# Patient Record
Sex: MALE | Race: WHITE | NOT HISPANIC OR LATINO | Employment: OTHER | URBAN - NONMETROPOLITAN AREA
[De-identification: names, ages, dates, MRNs, and addresses within clinical notes are randomized per-mention and may not be internally consistent; named-entity substitution may affect disease eponyms.]

---

## 2019-02-04 ENCOUNTER — OFFICE VISIT (OUTPATIENT)
Dept: NEUROSURGERY | Facility: CLINIC | Age: 69
End: 2019-02-04

## 2019-02-04 DIAGNOSIS — M51.36 BULGING LUMBAR DISC: Primary | ICD-10-CM

## 2019-02-04 DIAGNOSIS — M51.36 DDD (DEGENERATIVE DISC DISEASE), LUMBAR: ICD-10-CM

## 2019-02-04 PROCEDURE — 99213 OFFICE O/P EST LOW 20 MIN: CPT | Performed by: NEUROLOGICAL SURGERY

## 2019-02-04 NOTE — PROGRESS NOTES
Subjective   Patient ID: Reji Villela is a 68 y.o. male is being seen for consultation today at the request of MARJORIE Julian  Chief Complaint: Back and right leg pain    History of Present Illness: Patient is a 68-year-old man from [place] with a history of pain in his lower back radiating to the right hip and leg in his thigh as far as his knee with numbness of the lateral thigh.  Is been present for about 6 months, but has improved somewhat in the past 2 or 3 months.  Pain is worse with standing and walking.  He has not started physical therapy, however it has been ordered pending his consultation with neurosurgery.  Apparently had a consultation with neurology and it was suspected that there was a nerve root compression in the lumbar spine.    Review of Radiographic Studies:  Lumbar MRI scan done on 12/10/2018 shows degenerative disc and facet changes from L2 through S1, and at L2-3 on the left there is a herniated disc with a slightly caudally migrated component.  This is contralateral to the side of his clinical pain.    The following portions of the patient's history were reviewed, updated as appropriate and approved: allergies, current medications, past family history, past medical history, past social history, past surgical history, review of systems and problem list.    Review of Systems   Constitutional: Negative for activity change, appetite change, chills, diaphoresis, fatigue, fever and unexpected weight change.   HENT: Negative for congestion, dental problem, drooling, ear discharge, ear pain, facial swelling, hearing loss, mouth sores, nosebleeds, postnasal drip, rhinorrhea, sinus pressure, sneezing, sore throat, tinnitus, trouble swallowing and voice change.    Eyes: Negative for photophobia, pain, discharge, redness, itching and visual disturbance.   Respiratory: Negative for apnea, cough, choking, chest tightness, shortness of breath, wheezing and stridor.    Cardiovascular: Negative for  chest pain, palpitations and leg swelling.   Gastrointestinal: Negative for abdominal distention, abdominal pain, anal bleeding, blood in stool, constipation, diarrhea, nausea, rectal pain and vomiting.   Endocrine: Negative for cold intolerance, heat intolerance, polydipsia, polyphagia and polyuria.   Genitourinary: Negative for decreased urine volume, difficulty urinating, dysuria, enuresis, flank pain, frequency, genital sores, hematuria and urgency.   Musculoskeletal: Positive for arthralgias, back pain, gait problem, joint swelling, myalgias, neck pain and neck stiffness.   Skin: Negative for color change, pallor, rash and wound.   Allergic/Immunologic: Negative for environmental allergies, food allergies and immunocompromised state.   Neurological: Positive for dizziness, tremors, weakness and light-headedness. Negative for seizures, syncope, facial asymmetry, speech difficulty, numbness and headaches.   Hematological: Negative for adenopathy. Does not bruise/bleed easily.   Psychiatric/Behavioral: Negative for agitation, behavioral problems, confusion, decreased concentration, dysphoric mood, hallucinations, self-injury, sleep disturbance and suicidal ideas. The patient is not nervous/anxious and is not hyperactive.        Objective     NEUROLOGICAL EXAMINATION:      MENTAL STATUS:  Alert and oriented.  Speech intact.  Recent and remote memory intact.      CRANIAL NERVES:  Cranial nerve II:  Visual fields are full.  Cranial nerves III, IV and VI:  PERRLADC.  Extraocular movements are intact.  Nystagmus is not present.  Cranial nerve V:  Facial sensation is intact.  Cranial nerve VII:  Muscles of facial expression reveal no asymmetry.  Cranial nerve VIII:  Hearing is intact.  Cranial nerves IX and X:  Palate elevates symmetrically.  Cranial nerve XI:  Shoulder shrug is intact.  Cranial nerve XII:  Tongue is midline without evidence of atrophy or fasciculation.    MUSCULOSKELETAL:  SLR negative  bilaterally.    MOTOR: No weakness of knee extension, ankle dorsiflexion, or plantarflexion, although in the past he has tended to fall from a sense of weakness in the right leg.    SENSATION: Numbness of the right lateral thigh.    REFLEXES:  DTR absent both knees and both ankles.      Assessment   Right lumbar radicular pain, clinically from a mid lumbar level, however MRI scan is negative for disc herniation, stenosis, or instability on the right side.  There is a radiographic finding of L2-3 herniation on the left side, which is contralateral to his symptomatic side of pain.       Plan   Physical therapy should be done as the primary treatment.  There is not a surgical option that I can see, so I would rely on the result of his physical therapy.  Since he has already shown signs of improvement in symptoms this should be successful.       Al Hilton MD

## 2020-09-29 ENCOUNTER — TELEPHONE (OUTPATIENT)
Dept: NEUROSURGERY | Facility: CLINIC | Age: 70
End: 2020-09-29

## 2020-09-29 NOTE — TELEPHONE ENCOUNTER
PT IS A FORMER PT OF DR. SCOTT LOV 02/04/19. HE IS WANTING TO SCHEDULE AN APPT FOR NECK PAIN,  HANDS ARE GOING NUMB, AND LOWER BACK PAIN.     PATIENT # [phone]    PLEASE ADVISE

## 2020-10-15 ENCOUNTER — OFFICE VISIT (OUTPATIENT)
Dept: NEUROSURGERY | Facility: CLINIC | Age: 70
End: 2020-10-15

## 2020-10-15 VITALS
SYSTOLIC BLOOD PRESSURE: 134 MMHG | DIASTOLIC BLOOD PRESSURE: 64 MMHG | HEIGHT: 69 IN | BODY MASS INDEX: 28.79 KG/M2 | WEIGHT: 194.4 LBS | TEMPERATURE: 96.8 F

## 2020-10-15 DIAGNOSIS — M51.36 DDD (DEGENERATIVE DISC DISEASE), LUMBAR: ICD-10-CM

## 2020-10-15 DIAGNOSIS — M50.30 DDD (DEGENERATIVE DISC DISEASE), CERVICAL: Primary | ICD-10-CM

## 2020-10-15 PROCEDURE — 99213 OFFICE O/P EST LOW 20 MIN: CPT | Performed by: PHYSICIAN ASSISTANT

## 2020-10-15 RX ORDER — PREDNISONE 10 MG/1
10 TABLET ORAL DAILY
COMMUNITY
Start: 2020-08-06

## 2020-10-15 RX ORDER — TIZANIDINE 4 MG/1
4 TABLET ORAL NIGHTLY PRN
COMMUNITY
Start: 2020-09-18

## 2020-10-15 RX ORDER — ATORVASTATIN CALCIUM 40 MG/1
40 TABLET, FILM COATED ORAL DAILY
COMMUNITY
Start: 2020-09-08

## 2020-10-15 RX ORDER — EMPAGLIFLOZIN 25 MG/1
1 TABLET, FILM COATED ORAL EVERY MORNING
COMMUNITY
Start: 2020-09-16

## 2020-10-19 NOTE — PROGRESS NOTES
"Patient: Reji Villela  : 1950  Gender: male    Primary Care Provider: Provider, No Known    Chief Complaint: Back and neck pain.    History of Present Illness:  Reji Villela is a 69-year-old gentleman with a PMH significant for CAD, stroke, CKD, DM 2, hypertension.  He has a history of remote ACDF with Dr. Hilton.  He was last seen in our clinic on 2019 for back pain.  At that time studies demonstrated degenerative discs and facet changes L2-S1 with an asymptomatic herniated disc at L2-3 on the left.  No surgery was recommended.  Patient states that over the last 6 months or so his pain has worsened.  He cannot pinpoint what bothers him most.  He states that he has pain in his hands, hips, low back and bilateral legs.  He reports some sensory alteration in the left lower extremity.  His symptoms occur at all times and are not improved with any certain movement.  Patient's wife states that he is becoming \"more bent over\".  He denies gait disturbance, foot numbness, bowel or bladder dysfunction.  He has to lay down multiple times a day to get relief of his pain.  He does not really take much in the way of pain.  He has not been to any sort of therapy recently.  He has no new studies.      Past Medical and Surgical History:  Past Medical History:   Diagnosis Date   • Arthritis    • Asthma    • Chronic kidney disease    • Diabetes mellitus (CMS/HCC)    • Headache    • Heart disease    • Hypertension    • Low back pain    • Osteoporosis    • Stroke (CMS/[facility])      Past Surgical History:   Procedure Laterality Date   • CERVICAL FUSION  2012    ACDF -  @     • CORONARY STENT PLACEMENT     • KIDNEY STONE SURGERY         Current Medications:    Current Outpatient Medications:   •  atorvastatin (LIPITOR) 40 MG tablet, Take 40 mg by mouth Daily., Disp: , Rfl:   •  ciprofloxacin (CILOXAN) 0.3 % ophthalmic solution, , Disp: , Rfl:   •  clopidogrel (PLAVIX) 75 MG tablet, , Disp: , Rfl:   •  gabapentin " (NEURONTIN) 300 MG capsule, , Disp: , Rfl:   •  isosorbide mononitrate (IMDUR) 60 MG 24 hr tablet, , Disp: , Rfl:   •  Jardiance 25 MG tablet, Take 1 tablet by mouth Every Morning., Disp: , Rfl:   •  lisinopril (PRINIVIL,ZESTRIL) 40 MG tablet, , Disp: , Rfl:   •  metFORMIN (GLUCOPHAGE) 1000 MG tablet, , Disp: , Rfl:   •  metoprolol tartrate (LOPRESSOR) 50 MG tablet, , Disp: , Rfl:   •  NIFEdipine CC (ADALAT CC) 90 MG 24 hr tablet, , Disp: , Rfl:   •  prednisoLONE acetate (PRED FORTE) 1 % ophthalmic suspension, , Disp: , Rfl:   •  tiZANidine (ZANAFLEX) 4 MG tablet, Take 4 mg by mouth At Night As Needed., Disp: , Rfl:   •  cephalexin (KEFLEX) 500 MG capsule, , Disp: , Rfl:   •  hydrochlorothiazide (MICROZIDE) 12.5 MG capsule, , Disp: , Rfl:   •  predniSONE (DELTASONE) 10 MG tablet, Take 10 mg by mouth Daily. FOR 30 DAYS, Disp: , Rfl:     Allergies:  Allergies   Allergen Reactions   • Contrast Dye    • Iodine          Review of Systems   Musculoskeletal: Positive for arthralgias, back pain, gait problem, myalgias, neck pain and neck stiffness.   Neurological: Positive for numbness.   All other systems reviewed and are negative.        Physical Exam  Constitutional:       Appearance: Normal appearance.   HENT:      Head: Normocephalic and atraumatic.   Neck:      Musculoskeletal: Normal range of motion and neck supple.   Musculoskeletal: Normal range of motion.   Skin:     General: Skin is warm and dry.   Neurological:      Mental Status: He is alert and oriented to person, place, and time.      Sensory: Sensation is intact.      Motor: Motor function is intact.      Coordination: Coordination is intact.      Deep Tendon Reflexes:      Reflex Scores:       Bicep reflexes are 1+ on the right side and 1+ on the left side.       Brachioradialis reflexes are 1+ on the right side and 1+ on the left side.       Patellar reflexes are 1+ on the right side and 1+ on the left side.       Achilles reflexes are 1+ on the right side  "and 1+ on the left side.     Comments: Patient's neck and trunk flexed forward when walking  Gait is antalgic with assistance from a cane  Strength is equal and intact in upper and lower extremities   Psychiatric:         Mood and Affect: Mood normal.         Behavior: Behavior normal.           Vitals:    10/15/20 1502   BP: 134/64   BP Location: Left arm   Patient Position: Sitting   Cuff Size: Adult   Temp: 96.8 °F (36 °C)   TempSrc: Infrared   Weight: 88.2 kg (194 lb 6.4 oz)   Height: 175.3 cm (69\")       Patient's Body mass index is 28.71 kg/m². BMI is above normal parameters. Recommendations include: educational material.    Imaging Review:  No new imaging    Assessment:  1.  Chronic neck pain  2.  Chronic back pain    Plan:  Mr. Villela is seen today for evaluation of chronic neck and back pain.  His symptoms appear mechanical in nature with no focal radicular arm or leg complaints.  I had a long discussion with patient and his wife.  Deconditioning is likely playing a large role in his increased pain symptoms.  I recommended that he begin physical therapy to assist in getting some of his strength back.  Patient has multiple medical comorbidities.  I encouraged him to assure that his diabetes and coronary artery disease is followed closely.  I have ordered cervical x-rays to assess his hardware and lumbar x-rays for further evaluation.  We will schedule a telephone follow-up when these are completed.  Patient will call with any concerns or worsening symptoms.        Brunilda Joseph PA-C  "

## 2020-10-20 ENCOUNTER — HOSPITAL ENCOUNTER (OUTPATIENT)
Dept: GENERAL RADIOLOGY | Facility: HOSPITAL | Age: 70
Discharge: HOME | End: 2020-10-20

## 2020-10-20 DIAGNOSIS — M50.30 DDD (DEGENERATIVE DISC DISEASE), CERVICAL: ICD-10-CM

## 2020-10-20 DIAGNOSIS — M51.36 DDD (DEGENERATIVE DISC DISEASE), LUMBAR: ICD-10-CM

## 2020-10-20 PROCEDURE — 72050 X-RAY EXAM NECK SPINE 4/5VWS: CPT | Performed by: RADIOLOGY

## 2020-10-20 PROCEDURE — 72110 X-RAY EXAM L-2 SPINE 4/>VWS: CPT

## 2020-10-20 PROCEDURE — 72050 X-RAY EXAM NECK SPINE 4/5VWS: CPT

## 2020-10-20 PROCEDURE — 72110 X-RAY EXAM L-2 SPINE 4/>VWS: CPT | Performed by: RADIOLOGY

## 2020-11-23 ENCOUNTER — OFFICE VISIT (OUTPATIENT)
Dept: NEUROSURGERY | Facility: CLINIC | Age: 70
End: 2020-11-23

## 2020-11-23 VITALS — BODY MASS INDEX: 28.73 KG/M2 | WEIGHT: 194 LBS | HEIGHT: 69 IN

## 2020-11-23 DIAGNOSIS — M50.30 DDD (DEGENERATIVE DISC DISEASE), CERVICAL: Primary | ICD-10-CM

## 2020-11-23 DIAGNOSIS — M51.36 DDD (DEGENERATIVE DISC DISEASE), LUMBAR: ICD-10-CM

## 2020-11-23 PROCEDURE — 99441 PR PHYS/QHP TELEPHONE EVALUATION 5-10 MIN: CPT | Performed by: PHYSICIAN ASSISTANT

## 2020-11-23 NOTE — PROGRESS NOTES
"Patient: Reji Villela  : 1950  Gender: male    Primary Care Provider: Provider, No Known      Chief Complaint:   Chief Complaint   Patient presents with   • Back Pain   • Neck Pain       History of Present Illness:  Reji Villela is a 69-year-old gentleman with a PMH significant for CAD, stroke, CKD, DM 2, hypertension.  He has a history of remote ACDF with Dr. Hilton.  He has been seen in our clinic over the last couple of years for ongoing symptoms.  Most recently he presented with multiple vague complaints including pain in his hands, hips, low back and bilateral legs.  He reported some sensory alteration of the left lower extremity.  His wife is concerned that he was becoming \"more bent over\".  He is referred to physical therapy and presents today with lumbar and cervical x-rays.    Presently, Mr. Villela states that he is actually doing pretty well.  His PCP provided him with a new arthritis medication and some steroid shots which helped his low back pain significantly.  He feels that he is getting around easier at this point.  He only went to a couple of visits of physical therapy which ultimately worsened his symptoms.  Overall he is making progress with no new complaints at this time.      Past Medical and Surgical History:  Past Medical History:   Diagnosis Date   • Arthritis    • Asthma    • Chronic kidney disease    • Diabetes mellitus (CMS/HCC)    • Headache    • Heart disease    • Hypertension    • Low back pain    • Osteoporosis    • Stroke (CMS/HCC)      Past Surgical History:   Procedure Laterality Date   • CERVICAL FUSION  2012    ACDF -  @     • CORONARY STENT PLACEMENT     • KIDNEY STONE SURGERY         Current Medications:    Current Outpatient Medications:   •  atorvastatin (LIPITOR) 40 MG tablet, Take 40 mg by mouth Daily., Disp: , Rfl:   •  ciprofloxacin (CILOXAN) 0.3 % ophthalmic solution, , Disp: , Rfl:   •  clopidogrel (PLAVIX) 75 MG tablet, , Disp: , Rfl:   •  " "gabapentin (NEURONTIN) 300 MG capsule, , Disp: , Rfl:   •  hydrochlorothiazide (MICROZIDE) 12.5 MG capsule, , Disp: , Rfl:   •  isosorbide mononitrate (IMDUR) 60 MG 24 hr tablet, , Disp: , Rfl:   •  Jardiance 25 MG tablet, Take 1 tablet by mouth Every Morning., Disp: , Rfl:   •  lisinopril (PRINIVIL,ZESTRIL) 40 MG tablet, , Disp: , Rfl:   •  metFORMIN (GLUCOPHAGE) 1000 MG tablet, , Disp: , Rfl:   •  metoprolol tartrate (LOPRESSOR) 50 MG tablet, , Disp: , Rfl:   •  NIFEdipine CC (ADALAT CC) 90 MG 24 hr tablet, , Disp: , Rfl:   •  prednisoLONE acetate (PRED FORTE) 1 % ophthalmic suspension, , Disp: , Rfl:   •  predniSONE (DELTASONE) 10 MG tablet, Take 10 mg by mouth Daily. FOR 30 DAYS, Disp: , Rfl:   •  tiZANidine (ZANAFLEX) 4 MG tablet, Take 4 mg by mouth At Night As Needed., Disp: , Rfl:     Allergies:  Allergies   Allergen Reactions   • Contrast Dye    • Iodine          Review of Systems   Musculoskeletal: Positive for back pain and neck pain.         Physical Exam  Physical examination limited secondary to present COVID-19 environment.  Patient was alert, oriented and pleasant during our telephone encounter.  He appeared to be in no acute distress.  Speech was fluent and appropriate.    Vitals:    11/23/20 1012 11/23/20 1013   Weight: 88 kg (194 lb) 88 kg (194 lb)   Height:  175.3 cm (69\")       Patient's Body mass index is 28.65 kg/m². BMI is above normal parameters. Recommendations include: educational material.    Imaging Review:  XR lumbar spine performed 10/20/2020 demonstrates multilevel degenerative changes without evidence of instability.  XR cervical spine performed 10/20/2020 demonstrates fusion hardware C5-7.    Assessment:  1.  Chronic neck pain  2.  Chronic low back pain    Plan:  Mr. Villela is seen today via telephone follow-up with cervical and lumbar x-rays.  These images appear stable.  He has been working with his PCP who prescribed a new medication and gave him a steroid shot which helped " significantly.  He did not tolerate physical therapy very well.  I recommended he continue medications per his PCP.  He will call with any concerns or worsening symptoms and return to our clinic as needed.    This visit has been rescheduled as a phone visit to comply with patient safety concerns in accordance with CDC recommendations. Total time of discussion was 10 minutes.   You have chosen to receive care through a telephone visit. Do you consent to use a telephone visit for your medical care today? Yes     Brunilda Joseph PA-C